# Patient Record
Sex: MALE | Race: WHITE | URBAN - METROPOLITAN AREA
[De-identification: names, ages, dates, MRNs, and addresses within clinical notes are randomized per-mention and may not be internally consistent; named-entity substitution may affect disease eponyms.]

---

## 2017-09-28 ENCOUNTER — HOSPITAL ENCOUNTER (EMERGENCY)
Facility: CLINIC | Age: 32
Discharge: HOME | End: 2017-09-28
Attending: EMERGENCY MEDICINE | Admitting: EMERGENCY MEDICINE
Payer: COMMERCIAL

## 2017-09-28 VITALS
BODY MASS INDEX: 28.25 KG/M2 | OXYGEN SATURATION: 99 % | RESPIRATION RATE: 14 BRPM | HEIGHT: 77 IN | SYSTOLIC BLOOD PRESSURE: 119 MMHG | TEMPERATURE: 98 F | HEART RATE: 70 BPM | WEIGHT: 239.3 LBS | DIASTOLIC BLOOD PRESSURE: 94 MMHG

## 2017-09-28 DIAGNOSIS — M54.50 ACUTE BILATERAL LOW BACK PAIN WITHOUT SCIATICA: ICD-10-CM

## 2017-09-28 PROCEDURE — 25000128 H RX IP 250 OP 636: Performed by: EMERGENCY MEDICINE

## 2017-09-28 PROCEDURE — 96372 THER/PROPH/DIAG INJ SC/IM: CPT | Performed by: EMERGENCY MEDICINE

## 2017-09-28 PROCEDURE — 99284 EMERGENCY DEPT VISIT MOD MDM: CPT | Mod: Z6 | Performed by: EMERGENCY MEDICINE

## 2017-09-28 PROCEDURE — 99284 EMERGENCY DEPT VISIT MOD MDM: CPT | Performed by: EMERGENCY MEDICINE

## 2017-09-28 RX ORDER — METHYLPREDNISOLONE 4 MG
TABLET, DOSE PACK ORAL
Qty: 21 TABLET | Refills: 0 | Status: SHIPPED | OUTPATIENT
Start: 2017-09-28

## 2017-09-28 RX ORDER — TRIAMCINOLONE ACETONIDE 40 MG/ML
60 INJECTION, SUSPENSION INTRA-ARTICULAR; INTRAMUSCULAR ONCE
Status: COMPLETED | OUTPATIENT
Start: 2017-09-28 | End: 2017-09-28

## 2017-09-28 RX ADMIN — TRIAMCINOLONE ACETONIDE 60 MG: 40 INJECTION, SUSPENSION INTRA-ARTICULAR; INTRAMUSCULAR at 23:10

## 2017-09-28 NOTE — ED AVS SNAPSHOT
[facility], [place], Emergency Department    [address] [place] [address]    Phone:  [phone]                                       Sean Cook   MRN: [id number]    Department:  [facility], Emergency Department   Date of Visit:  9/28/2017           After Visit Summary Signature Page     I have received my discharge instructions, and my questions have been answered. I have discussed any challenges I see with this plan with the nurse or doctor.    ..........................................................................................................................................  Patient/Patient Representative Signature      ..........................................................................................................................................  Patient Representative Print Name and Relationship to Patient    ..................................................               ................................................  Date                                            Time    ..........................................................................................................................................  Reviewed by Signature/Title    ...................................................              ..............................................  Date                                                            Time

## 2017-09-28 NOTE — LETTER
To Whom it may concern:      Sean Cook was seen in our Emergency Department today, 09/28/17.  I expect his condition to improve over the next week.  He may not return to work/school until 10/1/17 and then will be unable to lift more than 20 pounds for one week.    Sincerely,          Sergio Marion MD, MD

## 2017-09-28 NOTE — ED AVS SNAPSHOT
[facility], Emergency Department    [address] [facility] [address]    Phone:  [phone]                                       Sean Cook   MRN: [id number]    Department:  [facility], Emergency Department   Date of Visit:  9/28/2017           Patient Information     Date Of Birth          1985        Your diagnoses for this visit were:     Acute bilateral low back pain without sciatica        You were seen by Sergio Marion MD.        Discharge Instructions       No lifting more than 20 pounds for one week    Return to the ER for worsening, incontinence, weakness, or fevers.    Please make an appointment to follow up with your back specialist or your Primary Care Provider in 5-7 days for recheck.    Discharge References/Attachments     BACK PAIN (ACUTE OR CHRONIC) (ENGLISH)      24 Hour Appointment Hotline       To make an appointment at any [facility] clinic, call [phone] ([phone]). If you don't have a family doctor or clinic, we will help you find one. [facility] clinics are conveniently located to serve the needs of you and your family.             Review of your medicines      START taking        Dose / Directions Last dose taken    methylPREDNISolone 4 MG tablet   Commonly known as:  MEDROL DOSEPAK   Quantity:  21 tablet        Follow package instructions   Refills:  0          Our records show that you are taking the medicines listed below. If these are incorrect, please call your family doctor or clinic.        Dose / Directions Last dose taken    IBUPROFEN PO   Dose:  600 mg        Take 600 mg by mouth   Refills:  0        TYLENOL PO   Dose:  325 mg        Take 325 mg by mouth   Refills:  0                Prescriptions were sent or printed at these locations (1 Prescription)                   Other Prescriptions                Printed at Department/Unit printer (1 of 1)         methylPREDNISolone (MEDROL DOSEPAK) 4 MG tablet                Orders Needing  "Specimen Collection     None      Pending Results     No orders found from 2017 to 2017.            Pending Culture Results     No orders found from 2017 to 2017.            Pending Results Instructions     If you had any lab results that were not finalized at the time of your Discharge, you can call the ED Lab Result RN at [phone]. You will be contacted by this team for any positive Lab results or changes in treatment. The nurses are available 7 days a week from 10A to 6:30P.  You can leave a message 24 hours per day and they will return your call.        Thank you for choosing [facility]       Thank you for choosing [facility] for your care. Our goal is always to provide you with excellent care. Hearing back from our patients is one way we can continue to improve our services. Please take a few minutes to complete the written survey that you may receive in the mail after you visit with us. Thank you!        [facility] Information     [facility] lets you send messages to your doctor, view your test results, renew your prescriptions, schedule appointments and more. To sign up, go to [email or web address] . Click on \"Log in\" on the left side of the screen, which will take you to the Welcome page. Then click on \"Sign up Now\" on the right side of the page.     You will be asked to enter the access code listed below, as well as some personal information. Please follow the directions to create your username and password.     Your access code is: [email or web address]  Expires: 2017 10:35 PM     Your access code will  in 90 days. If you need help or a new code, please call your [facility] clinic or [phone].        Care EveryWhere ID     This is your Care EveryWhere ID. This could be used by other organizations to access your [facility] medical records  [id number]        Equal Access to Services     ANIKET SIMS AH: Evelyn Croft, kate mckeon, naomi villarreal " [facility]. So [facility] [phone].    ATENCIÓN: Si habla español, tiene a payan disposición servicios gratuitos de asistencia lingüística. Llame al [phone].    We comply with applicable federal civil rights laws and [place] laws. We do not discriminate on the basis of race, color, national origin, age, disability sex, sexual orientation or gender identity.            After Visit Summary       This is your record. Keep this with you and show to your community pharmacist(s) and doctor(s) at your next visit.

## 2017-09-29 NOTE — ED PROVIDER NOTES
History     Chief Complaint   Patient presents with     Back Pain     HPI  Sean Cook is a 31 year old male who was recently hospitalized at [facility] for his back pain and found to have multiple disc bulges at several levels by MRI.  Patient was seen by their orthopedist who recommended physical therapy instead of surgery.  The patient was sent home today from their facility with prescriptions for pain pills however the patient does not want a pain pills and instead is requesting steroids.  Patient states he's had back pain for many years and states that it's always the steroids that help his back pain the most.  Patient has been followed by physical therapy and has seen back specialists in the past.  Patient apparently has never had surgery.  Patient denies any incontinence or weakness but states that he got home after being discharged and felt a burning sensation down into both of his feet.  Patient states he is frustrated and things steroids will help him.  Patient is requesting a steroid injection as well as oral steroids.    I have reviewed the Medications, Allergies, Past Medical and Surgical History, and Social History in the [employer] system.  History reviewed. No pertinent past medical history.    No current facility-administered medications on file prior to encounter.   No current outpatient prescriptions on file prior to encounter.  No Known Allergies  Social History     Social History     Marital status: Single     Spouse name: N/A     Number of children: N/A     Years of education: N/A     Occupational History     Not on file.     Social History Main Topics     Smoking status: Light Tobacco Smoker     Smokeless tobacco: Never Used      Comment: e-cigarette     Alcohol use No     Drug use: No     Sexual activity: Not on file     Other Topics Concern     Not on file     Social History Narrative     No narrative on file     History reviewed. No pertinent surgical history.  No family history on  "file.    Review of Systems   All other systems reviewed and are negative.      Physical Exam   BP: 120/81  Pulse: 70  Temp: 98  F (36.7  C)  Resp: 14  Height: 195.6 cm (6' 5\")  Weight: 108.5 kg (239 lb 4.8 oz)  SpO2: 99 %  Physical Exam   Constitutional: He is oriented to person, place, and time. He appears well-nourished.   HENT:   Head: Atraumatic.   Eyes: EOM are normal. Pupils are equal, round, and reactive to light.   Neck: Neck supple.   Pulmonary/Chest: No respiratory distress.   Musculoskeletal: He exhibits no edema, tenderness or deformity.   Neurological: He is alert and oriented to person, place, and time.   Grossly intact and symmetric with strength and sensation bilaterally   Skin: Skin is warm.   Psychiatric:   frustrated       ED Course     ED Course     Procedures          Assessments & Plan (with Medical Decision Making)     Medications   triamcinolone acetonide (KENALOG-40) injection 60 mg (not administered)   pending admin      I have reviewed the nursing notes.    Patient is not requesting any pain meds whatsoever and simply wants steroids to help with the inflammation in his back.    I have reviewed the findings, diagnosis, plan and need for follow up with the patient.    New Prescriptions    METHYLPREDNISOLONE (MEDROL DOSEPAK) 4 MG TABLET    Follow package instructions       Final diagnoses:   Acute bilateral low back pain without sciatica     No lifting more than 20 pounds for one week.  Work note given    Return to the ER for worsening, incontinence, weakness, or fevers.    Please make an appointment to follow up with your back specialist or your Primary Care Provider in 5-7 days for recheck.    Routine discharge instructions given for this diagnosis    Sergio Marion MD    9/28/2017   [facility], EMERGENCY DEPARTMENT     Sergio Marion MD  09/28/17 [id number]    "

## 2017-09-29 NOTE — ED NOTES
Pt presents ambulatory to triage with c/o inflamed L4 and L5 disks. States he was seen at [facility] this morning where they conformed that with an MRI and was given pain medication and refused a prescription for pain medication. Then was told to follow up with his PCP and PT/OT. Pt states normally he gets prednisone injections and just wants to get the swelling to go down.

## 2017-09-29 NOTE — DISCHARGE INSTRUCTIONS
No lifting more than 20 pounds for one week    Return to the ER for worsening, incontinence, weakness, or fevers.    Please make an appointment to follow up with your back specialist or your Primary Care Provider in 5-7 days for recheck.